# Patient Record
Sex: MALE | Race: WHITE | NOT HISPANIC OR LATINO
[De-identification: names, ages, dates, MRNs, and addresses within clinical notes are randomized per-mention and may not be internally consistent; named-entity substitution may affect disease eponyms.]

---

## 2024-08-13 ENCOUNTER — NON-APPOINTMENT (OUTPATIENT)
Age: 34
End: 2024-08-13

## 2024-08-13 PROBLEM — Z00.00 ENCOUNTER FOR PREVENTIVE HEALTH EXAMINATION: Status: ACTIVE | Noted: 2024-08-13

## 2024-08-21 ENCOUNTER — APPOINTMENT (OUTPATIENT)
Dept: ORTHOPEDIC SURGERY | Facility: CLINIC | Age: 34
End: 2024-08-21
Payer: COMMERCIAL

## 2024-08-21 DIAGNOSIS — L84 CORNS AND CALLOSITIES: ICD-10-CM

## 2024-08-21 DIAGNOSIS — Q66.89 OTHER SPECIFIED CONGENITAL DEFORMITIES OF FEET: ICD-10-CM

## 2024-08-21 DIAGNOSIS — M79.671 PAIN IN RIGHT FOOT: ICD-10-CM

## 2024-08-21 DIAGNOSIS — Z87.768 PERSONAL HISTORY OF OTHER SPECIFIED (CORRECTED) CONGENITAL MALFORMATIONS OF INTEGUMENT, LIMBS AND MUSCULOSKELETAL SYSTEM: ICD-10-CM

## 2024-08-21 PROCEDURE — 73630 X-RAY EXAM OF FOOT: CPT | Mod: RT

## 2024-08-21 PROCEDURE — 99204 OFFICE O/P NEW MOD 45 MIN: CPT

## 2024-08-21 NOTE — HISTORY OF PRESENT ILLNESS
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 He notes that nothing caused his pain in terms of traumatic incidents but claims that it is genetic. He recently in the past few years has built up callous on his right foot. The patient presents to the office in sneakers and ambulating without assistance.

## 2024-08-21 NOTE — DISCUSSION/SUMMARY
[de-identified] :  Today I had a lengthy discussion with the patient regarding their right foot big toe pain. I have addressed all the patient's concerns surrounding the pathology of their condition.  I have reviewed the patient's XR imaging with them in great detail. I recommend that the patient utilize ice, NSAIDS PRN, and heat. They can also elevate their RLE above the level of the heart.  At this time I would like to obtain advanced imaging of the patient's right foot. An MRI was ordered so I can find out more about the etiology of the patient's condition. The patient should follow up with the office after obtaining the MRI.  The patient understood and verbally agreed to the treatment plan. All of their questions were answered and they were satisfied with the visit. The patient should call the office if they have any questions or experience worsening symptoms.  FU after advanced imaging has been obtained.

## 2024-08-21 NOTE — ADDENDUM
[FreeTextEntry1] : I, Roosevelt Rangel, acted solely as a scribe for Dr. Roosevelt Braswell on this date 08/21/2024  .   All medical record entries made by the Scribe were at my, Dr. Roosevelt Braswell, direction and personally dictated by me on 08/21/2024 . I have reviewed the chart and agree that the record accurately reflects my personal performance of the history, physical exam, assessment and plan. I have also personally directed, reviewed, and agreed with the chart.

## 2024-08-21 NOTE — PHYSICAL EXAM
[de-identified] : General: Alert and oriented x3. In no acute distress. Pleasant in nature with a normal affect. No apparent respiratory distress.  R Foot Exam  Skin: Clean, dry, intact Inspection: No obvious malalignment, no masses, no swelling, no effusion Pulses: 2+ DP/PT pulses ROM: FOOT Full  ROM of digits, ANKLE 10 degrees of dorsiflexion, 40 degrees of plantarflexion, 10 degrees of subtalar motion, 20 degrees of inversion, 20 degrees of eversion.  Painful ROM: None Tenderness: No tenderness over the medial malleolus, No tenderness over the lateral malleolus, no CFL/ATFL/PTFL pain, no deltoid ligament pain. No heel pain. No Achilles tenderness. No 5th metatarsal pain. No pain to the LisFranc joint. No ttp over the posterior tibial tendon. Stability: Negative anterior/posterior drawer. Strength: 5/5 ADD/ABD/TA/GS/EHL/FHL/EDL Neuro: Sensation in tact to light touch throughout Additional tests: Negative Mortons test, Negative Tinels Sign, Negative Single Heel Rise [de-identified] : 3V of the right foot were ordered obtained and reviewed by me today, 08/21/2024 , revealed: no fractures noted.

## 2024-09-06 ENCOUNTER — APPOINTMENT (OUTPATIENT)
Dept: MRI IMAGING | Facility: CLINIC | Age: 34
End: 2024-09-06

## 2024-09-06 ENCOUNTER — OUTPATIENT (OUTPATIENT)
Dept: OUTPATIENT SERVICES | Facility: HOSPITAL | Age: 34
LOS: 1 days | End: 2024-09-06
Payer: COMMERCIAL

## 2024-09-06 DIAGNOSIS — L84 CORNS AND CALLOSITIES: ICD-10-CM

## 2024-09-06 PROCEDURE — 73718 MRI LOWER EXTREMITY W/O DYE: CPT

## 2024-09-06 PROCEDURE — 73718 MRI LOWER EXTREMITY W/O DYE: CPT | Mod: 26,RT

## 2024-09-12 ENCOUNTER — TRANSCRIPTION ENCOUNTER (OUTPATIENT)
Age: 34
End: 2024-09-12

## 2024-09-13 ENCOUNTER — APPOINTMENT (OUTPATIENT)
Dept: ORTHOPEDIC SURGERY | Facility: CLINIC | Age: 34
End: 2024-09-13

## 2024-09-23 ENCOUNTER — APPOINTMENT (OUTPATIENT)
Dept: ORTHOPEDIC SURGERY | Facility: CLINIC | Age: 34
End: 2024-09-23
Payer: COMMERCIAL

## 2024-09-23 DIAGNOSIS — Q66.89 OTHER SPECIFIED CONGENITAL DEFORMITIES OF FEET: ICD-10-CM

## 2024-09-23 DIAGNOSIS — L84 CORNS AND CALLOSITIES: ICD-10-CM

## 2024-09-23 DIAGNOSIS — M79.671 PAIN IN RIGHT FOOT: ICD-10-CM

## 2024-09-23 DIAGNOSIS — Z87.768 PERSONAL HISTORY OF OTHER SPECIFIED (CORRECTED) CONGENITAL MALFORMATIONS OF INTEGUMENT, LIMBS AND MUSCULOSKELETAL SYSTEM: ICD-10-CM

## 2024-09-23 PROCEDURE — 99214 OFFICE O/P EST MOD 30 MIN: CPT

## 2024-09-23 NOTE — DISCUSSION/SUMMARY
[de-identified] :  Today I had a lengthy discussion with the patient regarding their right foot big toe pain. I have addressed all the patient's concerns surrounding the pathology of their condition. I have reviewed the patient's MRI results with them in great detail. 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 Ill-defined scarring/callus in the distal soft tissues of the first, second, and third toes without well-formed collection. The patient will continue with conservative treatments. No osteomyelitis. I advised the patient that his conditions may be life-long.   Plan: 1. I recommend that the patient utilize ice, NSAIDS PRN, and heat. They can also elevate their RLE above the level of the heart. 2. I advised the patient to have bi-annual or annual x-rays for monitor surveillance with his PCP.  3. A discussion was had about shoe-wear modifications. I advised the patient to utilize a wide toed cross training sneaker that better accommodates the feet. I recommended [employer], [employer], [employer], or [employer] to the patient.  f/u prn  The patient understood and verbally agreed to the treatment plan. All of their questions were answered and they were satisfied with the visit. The patient should call the office if they have any questions or experience worsening symptoms.

## 2024-09-23 NOTE — ADDENDUM
[FreeTextEntry1] : I, Liu Rodriguez, acted solely as a scribe for Dr. Roosevelt Braswell on this date 09/23/2024.   All medical record entries made by the Scribe were at my, Dr. Roosevelt Braswell, direction and personally dictated by me on 09/23/2024. I have reviewed the chart and agree that the record accurately reflects my personal performance of the history, physical exam, assessment and plan. I have also personally directed, reviewed, and agreed with the chart.

## 2024-09-23 NOTE — HISTORY OF PRESENT ILLNESS
[FreeTextEntry1] : 09/23/2024: POPPY FALL is a 34 year old male presenting to the office for a follow up evaluation and MRI review of his right foot. He does not report any change in his symptoms since last visit. The patient presents to the office in sneakers and ambulating without assistance.   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 He notes that nothing caused his pain in terms of traumatic incidents but claims that it is genetic. He recently in the past few years has built up callous on his right foot. The patient presents to the office in sneakers and ambulating without assistance.

## 2024-09-23 NOTE — PHYSICAL EXAM
[de-identified] : General: Alert and oriented x3. In no acute distress. Pleasant in nature with a normal affect. No apparent respiratory distress.  R Foot Exam  Skin: Clean, dry, intact Inspection: No obvious malalignment, no masses, no swelling, no effusion Pulses: 2+ DP/PT pulses ROM: FOOT Full  ROM of digits, ANKLE 10 degrees of dorsiflexion, 40 degrees of plantarflexion, 10 degrees of subtalar motion, 20 degrees of inversion, 20 degrees of eversion.  Painful ROM: None Tenderness: No tenderness over the medial malleolus, No tenderness over the lateral malleolus, no CFL/ATFL/PTFL pain, no deltoid ligament pain. No heel pain. No Achilles tenderness. No 5th metatarsal pain. No pain to the LisFranc joint. No ttp over the posterior tibial tendon. Stability: Negative anterior/posterior drawer. Strength: 5/5 ADD/ABD/TA/GS/EHL/FHL/EDL Neuro: Sensation in tact to light touch throughout Additional tests: Negative Mortons test, Negative Tinels Sign, Negative Single Heel Rise [de-identified] : EXAM: [id number] - MR FOOT RT  - ORDERED BY:  RADHA LOUISE   PROCEDURE DATE:  09/06/2024    INTERPRETATION:  EXAMINATION: MRI of the right foot contrast  CLINICAL INFORMATION: Right toe callus. Evaluate for osteomyelitis.  TECHNIQUE: Multiplanar, multisequential MR imaging was performed.  FINDINGS: The distal phalanx of the great toe is absent. The middle and distal phalanges of the second toe are absent. The distal aspect of the middle phalanx and the distal phalanx of the third toe are absent. There is some ill-defined scarring/callus in the soft tissues overlying the distal aspect of the first and second proximal phalanges and overlying the distal aspect of the middle phalanx of the third toe. There is no well-formed collection. There are no areas of marrow signal alteration that raise suspicion for osteomyelitis.  There is no acute fracture or osteonecrosis. There are no joint effusions or advanced arthritic changes. There is no muscle atrophy or edema. There is a small amount of fluid within the first and third intermetatarsal space bursae.  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 Ill-defined scarring/callus in the distal soft tissues of the first, second, and third toes without well-formed collection. No osteomyelitis  --- End of Report ---       BRENDA FOX MD; Attending Radiologist This document has been electronically signed. Sep 11 2024 10:17AM

## 2025-03-31 ENCOUNTER — NON-APPOINTMENT (OUTPATIENT)
Age: 35
End: 2025-03-31

## 2025-04-22 ENCOUNTER — NON-APPOINTMENT (OUTPATIENT)
Age: 35
End: 2025-04-22

## 2025-08-18 ENCOUNTER — NON-APPOINTMENT (OUTPATIENT)
Age: 35
End: 2025-08-18